# Patient Record
Sex: FEMALE | Race: OTHER | Employment: UNEMPLOYED | URBAN - METROPOLITAN AREA
[De-identification: names, ages, dates, MRNs, and addresses within clinical notes are randomized per-mention and may not be internally consistent; named-entity substitution may affect disease eponyms.]

---

## 2022-05-18 ENCOUNTER — TELEPHONE (OUTPATIENT)
Dept: FAMILY PLANNING/WOMEN'S HEALTH CLINIC | Age: 27
End: 2022-05-18

## 2022-05-23 ENCOUNTER — TELEPHONE (OUTPATIENT)
Dept: FAMILY PLANNING/WOMEN'S HEALTH CLINIC | Age: 27
End: 2022-05-23

## 2022-05-23 NOTE — TELEPHONE ENCOUNTER
Received referral paperwork from [address] for pt need for colposcopy. Patient has not completed eligibility for [facility] [email or web address] [facility] program. Will route to [facility] to follow up with patient.

## 2022-06-22 ENCOUNTER — TELEPHONE (OUTPATIENT)
Dept: FAMILY PLANNING/WOMEN'S HEALTH CLINIC | Age: 27
End: 2022-06-22

## 2022-06-22 NOTE — TELEPHONE ENCOUNTER
[facility] received a call from patient requesting an appointment for an abnormal pap. Patient was told to get a Colposcopy     Previous Provider: Regi Dawson (patient doesn't remember exact name)    When Diagnosed/Seen : 04/2021    Best day of the week for Gynecologist appointment? Anyday    Do you prefer Morning or afternoon?  Morning     \"Staff at [facility] will try their best to schedule the appointment base on your preferences, but is not guatanteed due to availability, they will call you with further details and instructions within 5 business days if you do not hear from them \"

## 2022-06-29 NOTE — TELEPHONE ENCOUNTER
Nurse calling patient to explain [facility] [employer] program and colposcopy appt. Details. No answer, left message instructing to return phone call.  Kenneth Bray, KRYSTAL

## 2022-06-30 NOTE — TELEPHONE ENCOUNTER
Patient qualifies for [facility] program.   Nurse reviewed [facility] program with patient and she agrees to have her colposcopy through us. Colposcopy preparation instructions reviewed. Patient agrees with date, time and place of appt. This has been fully explained to the patient, who indicates understanding and agrees with plan. No further questions at this time.  Alannah Capone RN

## 2022-08-16 ENCOUNTER — TELEPHONE (OUTPATIENT)
Dept: FAMILY PLANNING/WOMEN'S HEALTH CLINIC | Age: 27
End: 2022-08-16

## 2022-08-16 NOTE — PROGRESS NOTES
Abnormal pap evaluation     Subjective: Rachel Monteiro is a 23 y.o. female seen for evaluation of abnormal Pap smear. Most recent Pap smear 2022 weeks ago result: ASCUS  Prior cervical/vaginal disease: none. Prior cervical treatment: referral to Gynecology. Marital status: single  Sexual history: has sex with males. DP for contraception - considering pregnancy again, one       No past medical history on file. No past surgical history on file. Social History     Occupational History    Not on file   Tobacco Use    Smoking status: Not on file    Smokeless tobacco: Not on file   Substance and Sexual Activity    Alcohol use: Not on file    Drug use: Not on file    Sexual activity: Not on file     No family history on file.     Not on File  Prior to Admission medications    Not on File        Review of Systems - History obtained from the patient-negative for:  Constitutional: weight loss, fever, night sweats  GI: change in bowel habits, abdominal pain, black or bloody stools  : frequency, dysuria, hematuria, vaginal discharge  MSK: back pain, joint pain, muscle pain  Skin: itching, rash, hives  Neuro: dizziness, headache, confusion, weakness  Psych: anxiety, depression, change in mood  Heme/lymph: bleeding, bruising, pallor       Objective:     Visit Vitals  /70   Pulse 66   Wt 135 lb (61.2 kg)        Physical Exam:     Constitutional  Appearance: well-nourished, well developed, alert, in no acute distress    HENT  Head and Face: appears normal      Genitourinary  External Genitalia: normal appearance for age, no discharge present, no tenderness present, no inflammatory lesions present, no masses present, no atrophy present  Vagina: normal vaginal vault without central or paravaginal defects, no discharge present, no inflammatory lesions present, no masses present  Bladder: non-tender to palpation  Urethra: appears normal  Cervix: normal   Perineum: perineum within normal limits, no evidence of trauma, no rashes or skin lesions present  Anus: anus within normal limits, no hemorrhoids present  Inguinal Lymph Nodes: no lymphadenopathy present    Skin  General Inspection: no rash, no lesions identified    Neurologic/Psychiatric  Mental Status:  Orientation: grossly oriented to person, place and time  Mood and Affect: mood normal, affect appropriate            Assessment/Plan:   Colposcopy was performed today, see procedure note, plan is pending results of pathology report  Disc HPV and HPV association with cervical cancer - importance of maintaining fu  Used     Amenable to LEEP if indicated    [facility] NOTE        Chart reviewed for the following:   Hildreth Spurling, MD, have reviewed the History, Physical and updated the Allergic reactions for [facility] performed immediately prior to start of procedure:   Hildreth Spurling, MD, have performed the following reviews on [facility] prior to the start of the procedure:            * Patient was identified by name and date of birth   * Agreement on procedure being performed was verified  * Risks and Benefits explained to the patient  * Procedure site verified and marked as necessary  * Patient was positioned for comfort  * Consent was signed and verified     Time: 3:24 PM        Date of procedure: 8/18/2022    Procedure performed by:  Vaughn Mcghee MD    How tolerated by patient: tolerated the procedure well with no complications    Post Procedural Pain Scale: 2 - Hurts Little Bit    Comments: none    Procedure Note: Colposcopy    Karen School is a No obstetric history on file. ,  23 y.o. female / No LMP recorded. Patient has had an injection. She presents for colposcopy for evaluation of a cervical abnormality with a pap smear abnormality consisting of ASCUS and HPV.  After being presented with the risks, benefits and alternatives has she signed a consent for the procedure. She states that she understands the need for the procedure and has no further questions. She was informed that she may experience discomfort. Procedure:  She was positioned in the dorsal lithotomy position and a speculum was inserted into the vagina. Dilute acetic acid was applied to the cervix. The colposcope was used to visualize the cervix. Procedure: The transformation zone was completely visualized. Findings: This colposcopy was satisfactory. The procedure was notable for white epithelium on the cervix. Biopsies were taken from the cervix . See accompanying diagram for biopsy sites. Monsels was applied to the cervix. Endocervical currettage: An endocervical curettage was performed. Post Procedure Status: The patient tolerated the procedure well with minimal discomfort. She was observed for 10 minutes and released in good condition.

## 2022-08-16 NOTE — TELEPHONE ENCOUNTER
Called patient to confirm appointment. Patient stated that she will be at her appointment. No further questions or concerns.

## 2022-08-16 NOTE — TELEPHONE ENCOUNTER
Can you please contact the patient and confirm that she will be able to go to her upcoming colposcopy appt. Make sure she has the address, date and time.  Thanks

## 2022-08-18 ENCOUNTER — OFFICE VISIT (OUTPATIENT)
Dept: OBGYN CLINIC | Age: 27
End: 2022-08-18

## 2022-08-18 ENCOUNTER — HOSPITAL ENCOUNTER (OUTPATIENT)
Dept: LAB | Age: 27
Discharge: HOME | End: 2022-08-18

## 2022-08-18 VITALS — HEART RATE: 66 BPM | WEIGHT: 135 LBS | DIASTOLIC BLOOD PRESSURE: 70 MMHG | SYSTOLIC BLOOD PRESSURE: 116 MMHG

## 2022-08-18 DIAGNOSIS — R87.810 ASCUS WITH POSITIVE HIGH RISK HPV CERVICAL: ICD-10-CM

## 2022-08-18 DIAGNOSIS — R87.610 ASCUS OF CERVIX WITH NEGATIVE HIGH RISK HPV: Primary | ICD-10-CM

## 2022-08-18 DIAGNOSIS — R87.610 ASCUS WITH POSITIVE HIGH RISK HPV CERVICAL: ICD-10-CM

## 2022-08-18 LAB
HCG URINE, QL. (POC): NEGATIVE
VALID INTERNAL CONTROL?: YES

## 2022-08-18 PROCEDURE — 81025 URINE PREGNANCY TEST: CPT | Performed by: OBSTETRICS & GYNECOLOGY

## 2022-08-18 PROCEDURE — 99203 OFFICE O/P NEW LOW 30 MIN: CPT | Performed by: OBSTETRICS & GYNECOLOGY

## 2022-08-18 PROCEDURE — 57454 BX/CURETT OF CERVIX W/SCOPE: CPT | Performed by: OBSTETRICS & GYNECOLOGY

## 2022-09-08 ENCOUNTER — TELEPHONE (OUTPATIENT)
Dept: FAMILY PLANNING/WOMEN'S HEALTH CLINIC | Age: 27
End: 2022-09-08

## 2022-09-08 NOTE — TELEPHONE ENCOUNTER
Nurse calling patient in Re: follow up after Colposcopy appt. On 8/18/2022. Per patient she has not received the results yet. Chart reviewed and the following result note and recommendations from Dr. Viad Moreland about the cervical biopsies from 8/18/2022 reviewed with patient. Discussed importance of following up in 1 year to have pap smear [facility]. Nurse also discussed with patient that since now her diagnostic studies for cervical cancer have been completed she is now discharged from the [facility] [employer] program. Patient is aware that she is always welcome to call us if she has any questions. This has been fully explained to the patient, who indicates understanding and agrees with plan. No further questions at this time.  Kourtney Julian RN

## 2024-01-19 NOTE — TELEPHONE ENCOUNTER
Contacted patient to screen for [facility] program. Patient did not answer and voicemail was left with information on how to be screened and scheduled. yes